# Patient Record
Sex: FEMALE | Race: WHITE | NOT HISPANIC OR LATINO | Employment: FULL TIME | URBAN - METROPOLITAN AREA
[De-identification: names, ages, dates, MRNs, and addresses within clinical notes are randomized per-mention and may not be internally consistent; named-entity substitution may affect disease eponyms.]

---

## 2023-06-05 ENCOUNTER — OFFICE VISIT (OUTPATIENT)
Dept: PRIMARY CARE | Facility: CLINIC | Age: 33
End: 2023-06-05
Payer: COMMERCIAL

## 2023-06-05 VITALS
HEART RATE: 81 BPM | WEIGHT: 173.6 LBS | SYSTOLIC BLOOD PRESSURE: 120 MMHG | OXYGEN SATURATION: 100 % | BODY MASS INDEX: 25.71 KG/M2 | HEIGHT: 69 IN | DIASTOLIC BLOOD PRESSURE: 84 MMHG

## 2023-06-05 DIAGNOSIS — L02.01 CUTANEOUS ABSCESS OF FACE: Primary | ICD-10-CM

## 2023-06-05 PROBLEM — T14.8XXA MUSCLE STRAIN: Status: ACTIVE | Noted: 2023-06-05

## 2023-06-05 PROBLEM — N64.4 PAIN OF RIGHT BREAST: Status: ACTIVE | Noted: 2023-06-05

## 2023-06-05 PROBLEM — H66.90 OTITIS MEDIA: Status: ACTIVE | Noted: 2023-06-05

## 2023-06-05 PROBLEM — J02.9 PHARYNGITIS: Status: ACTIVE | Noted: 2023-06-05

## 2023-06-05 PROCEDURE — 99213 OFFICE O/P EST LOW 20 MIN: CPT | Performed by: NURSE PRACTITIONER

## 2023-06-05 PROCEDURE — 1036F TOBACCO NON-USER: CPT | Performed by: NURSE PRACTITIONER

## 2023-06-05 RX ORDER — CEPHALEXIN 500 MG/1
500 CAPSULE ORAL 2 TIMES DAILY
Qty: 20 CAPSULE | Refills: 0 | Status: SHIPPED | OUTPATIENT
Start: 2023-06-05 | End: 2023-06-15

## 2023-06-05 RX ORDER — MULTIVITAMIN
TABLET ORAL
COMMUNITY

## 2023-06-05 ASSESSMENT — ENCOUNTER SYMPTOMS
EYES NEGATIVE: 1
ROS SKIN COMMENTS: AS NOTED IN HPI
NEUROLOGICAL NEGATIVE: 1
RESPIRATORY NEGATIVE: 1
CARDIOVASCULAR NEGATIVE: 1
GASTROINTESTINAL NEGATIVE: 1
CONSTITUTIONAL NEGATIVE: 1

## 2023-06-05 ASSESSMENT — PAIN SCALES - GENERAL: PAINLEVEL: 2

## 2023-06-05 NOTE — PROGRESS NOTES
"Subjective   Patient ID: Thelma Schafer is a 32 y.o. female who presents for Facial Swelling (X 1 week. It's at it's worse in the morning and gets better as the day goes on but than its back in the morning. She gets some pain. She thought it was a pimple at first but now doesn't think it is. It's warm to the touch. Pain in theam is a 5. ).    HPI   Patient here for ongoing acute concern, last provider was lynn Hernandez 02/28/2022.  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   Current concern:  1) Swelling left side on nose under eye for approximately one week (Tuesday of last week appearted).  Worse in the morning and better throughout day. Some pain, at first she thought is was a pimple. Warm to touch. She is pregnant currently. OBGYN told her to be seen by primary care. She has used warm compresses, cool compression, Bactroban ointment at night.    Skin is so dry. Skin is definitely dry. Flushed were the swelling is. No eye discharge, no change vision. Taking Flonase every night but discontinued use to see if it was causing swelling.  38 weeks on Thursday scheduled to be induced 06/15/2023.    Chronic Concerns:  None   Specialist  - [facility] 05/2022 through outside provider.     Review of Systems   Constitutional: Negative.    HENT: Negative.     Eyes: Negative.    Respiratory: Negative.     Cardiovascular: Negative.    Gastrointestinal: Negative.    Skin:         As noted in HPI    Neurological: Negative.    All other systems reviewed and are negative.      Objective   /84 (BP Location: Right arm, Patient Position: Sitting, BP Cuff Size: Adult)   Pulse 81   Ht 1.753 m (5' 9\")   Wt 78.7 kg (173 lb 9.6 oz)   SpO2 100%   BMI 25.64 kg/m²     Physical Exam  Vitals reviewed.   Constitutional:       Appearance: Normal appearance.   HENT:      Head:        Comments: Swelling as noted on diagram with central area 1 mm dried blood, slightly excoriated skin " surrounding, palpable semi-hard 2 mm rounded palpable under ski, no drainage or warmth noted.       Nose: No nasal tenderness.      Right Sinus: No maxillary sinus tenderness or frontal sinus tenderness.      Left Sinus: No maxillary sinus tenderness or frontal sinus tenderness.        Mouth/Throat:      Lips: Pink.      Mouth: Mucous membranes are moist.      Pharynx: Oropharynx is clear.   Cardiovascular:      Rate and Rhythm: Normal rate.   Pulmonary:      Effort: Pulmonary effort is normal.      Breath sounds: Normal breath sounds.   Skin:     Findings: Lesion present.   Neurological:      Mental Status: She is alert.     Assessment/Plan   Diagnoses and all orders for this visit:  Cutaneous abscess of face  -     cephalexin (Keflex) 500 mg capsule; Take 1 capsule (500 mg) by mouth 2 times a day for 10 days. Call back if no improvement consider increasing 4 x day.  Patient is scheduled to have induction for her delivery 06/15/2023.   PLAN/FOLLOW UP YEARLY WELLNESS.

## 2023-08-08 PROBLEM — O92.70 DISORDER OF LACTATION (HHS-HCC): Status: ACTIVE | Noted: 2023-07-31

## 2023-08-08 PROBLEM — E07.9 THYROID DISORDER: Status: ACTIVE | Noted: 2023-08-08

## 2023-08-08 PROBLEM — L02.01 ABSCESS OF FACE: Status: ACTIVE | Noted: 2023-08-08

## 2023-08-08 PROBLEM — N60.19 FIBROCYSTIC BREAST CHANGES: Status: ACTIVE | Noted: 2023-08-08

## 2023-08-08 RX ORDER — IBUPROFEN 600 MG/1
600 TABLET ORAL EVERY 6 HOURS PRN
COMMUNITY
Start: 2023-06-12

## 2023-08-08 RX ORDER — DOCUSATE SODIUM 100 MG/1
2 CAPSULE, LIQUID FILLED ORAL NIGHTLY
COMMUNITY
Start: 2023-06-12

## 2023-08-10 ENCOUNTER — OFFICE VISIT (OUTPATIENT)
Dept: PRIMARY CARE | Facility: CLINIC | Age: 33
End: 2023-08-10
Payer: COMMERCIAL

## 2023-08-10 VITALS
DIASTOLIC BLOOD PRESSURE: 64 MMHG | HEIGHT: 69 IN | SYSTOLIC BLOOD PRESSURE: 108 MMHG | HEART RATE: 66 BPM | WEIGHT: 155.6 LBS | OXYGEN SATURATION: 97 % | BODY MASS INDEX: 23.05 KG/M2

## 2023-08-10 DIAGNOSIS — Z00.00 WELLNESS EXAMINATION: Primary | ICD-10-CM

## 2023-08-10 DIAGNOSIS — Z13.29 SCREENING FOR THYROID DISORDER: ICD-10-CM

## 2023-08-10 DIAGNOSIS — Z13.1 SCREENING FOR DIABETES MELLITUS: ICD-10-CM

## 2023-08-10 DIAGNOSIS — Z13.220 SCREENING, LIPID: ICD-10-CM

## 2023-08-10 PROCEDURE — 1036F TOBACCO NON-USER: CPT | Performed by: NURSE PRACTITIONER

## 2023-08-10 PROCEDURE — 99385 PREV VISIT NEW AGE 18-39: CPT | Performed by: NURSE PRACTITIONER

## 2023-08-10 ASSESSMENT — ENCOUNTER SYMPTOMS
CONSTITUTIONAL NEGATIVE: 1
ALLERGIC/IMMUNOLOGIC NEGATIVE: 1
GASTROINTESTINAL NEGATIVE: 1
MUSCULOSKELETAL NEGATIVE: 1
NEUROLOGICAL NEGATIVE: 1
HEMATOLOGIC/LYMPHATIC NEGATIVE: 1
ENDOCRINE NEGATIVE: 1
CARDIOVASCULAR NEGATIVE: 1
PSYCHIATRIC NEGATIVE: 1
RESPIRATORY NEGATIVE: 1

## 2023-08-10 ASSESSMENT — PAIN SCALES - GENERAL: PAINLEVEL: 0-NO PAIN

## 2023-08-10 NOTE — PROGRESS NOTES
"Subjective   Patient ID: Thelma Schafer is a 32 y.o. female who presents for Annual Exam (LOV 6/5/23/Pt has form but she forgot to bring it with her, she will send it to us through [employer]. ).    HPI   Patient here for wellness exam, last seen on 06/05/2023. Had her baby boy 2 months ago (Leon)  Will have form for insurance through My chart will need to be printed and faxed once labs reviewed back to insurance.   Current Concerns: None   Chronic concerns; None  Specialist  - OBGYN  Labs 06/2023 through OB     Review of Systems   Constitutional: Negative.    HENT: Negative.     Eyes:         Glasses & contact   Respiratory: Negative.     Cardiovascular: Negative.    Gastrointestinal: Negative.    Endocrine: Negative.    Genitourinary: Negative.    Musculoskeletal: Negative.    Skin: Negative.    Allergic/Immunologic: Negative.    Neurological: Negative.    Hematological: Negative.    Psychiatric/Behavioral: Negative.  Sleep disturbance: has 2 month old baby.        Objective   /64 (BP Location: Left arm, Patient Position: Sitting, BP Cuff Size: Adult)   Pulse 66   Ht 1.74 m (5' 8.5\")   Wt 70.6 kg (155 lb 9.6 oz)   SpO2 97%   BMI 23.31 kg/m²   Waist circumference: 35 3/4\" (91.5 cm)     Physical Exam  Vitals reviewed.   Constitutional:       Appearance: Normal appearance.   HENT:      Right Ear: Tympanic membrane and ear canal normal.      Left Ear: Tympanic membrane and ear canal normal.      Nose: Nose normal.      Mouth/Throat:      Lips: Pink.      Pharynx: Oropharynx is clear.   Eyes:      General: Lids are normal.      Conjunctiva/sclera: Conjunctivae normal.   Neck:      Thyroid: No thyromegaly.   Cardiovascular:      Rate and Rhythm: Normal rate and regular rhythm.      Heart sounds: Normal heart sounds.   Pulmonary:      Effort: Pulmonary effort is normal.      Breath sounds: Normal breath sounds.   Abdominal:      General: Abdomen is flat. Bowel sounds are normal.      Palpations: Abdomen is soft. "   Musculoskeletal:         General: Normal range of motion.      Cervical back: Normal range of motion and neck supple.   Lymphadenopathy:      Cervical: No cervical adenopathy.   Skin:     General: Skin is warm.   Neurological:      General: No focal deficit present.   Psychiatric:         Mood and Affect: Mood normal.         Behavior: Behavior normal.         Judgment: Judgment normal.       Assessment/Plan   Diagnoses and all orders for this visit:  Health Maintenance  Labs- orders today provided   Influenza  Prevnar 13/20- not indicated   Shingrix  not indicated  Colonoscopy- no family history age 45 recommended   Cervical Cancer screen - GYN   Mammogram no family history age 40 recommended  DEXA BONE Density Not indicated   Wellness examination  -     CBC and Auto Differential; Future  -     Comprehensive Metabolic Panel; Future  Screening for thyroid disorder  -     TSH with reflex to Free T4 if abnormal; Future  Screening, lipid  -     Lipid Panel; Future  Screening for diabetes mellitus  -     Hemoglobin A1C; Future  PLAN: follow up yearly for wellness  Complete physical form once received and labs reviewed, let patient know of results and that form faxed.

## 2023-08-12 ENCOUNTER — LAB (OUTPATIENT)
Dept: LAB | Facility: LAB | Age: 33
End: 2023-08-12
Payer: COMMERCIAL

## 2023-08-12 DIAGNOSIS — Z13.1 SCREENING FOR DIABETES MELLITUS: ICD-10-CM

## 2023-08-12 DIAGNOSIS — Z13.220 SCREENING, LIPID: ICD-10-CM

## 2023-08-12 DIAGNOSIS — Z13.29 SCREENING FOR THYROID DISORDER: ICD-10-CM

## 2023-08-12 DIAGNOSIS — Z00.00 WELLNESS EXAMINATION: ICD-10-CM

## 2023-08-12 LAB
ALANINE AMINOTRANSFERASE (SGPT) (U/L) IN SER/PLAS: 15 U/L (ref 7–45)
ALBUMIN (G/DL) IN SER/PLAS: 4.6 G/DL (ref 3.4–5)
ALKALINE PHOSPHATASE (U/L) IN SER/PLAS: 63 U/L (ref 33–110)
ANION GAP IN SER/PLAS: 12 MMOL/L (ref 10–20)
ASPARTATE AMINOTRANSFERASE (SGOT) (U/L) IN SER/PLAS: 14 U/L (ref 9–39)
BASOPHILS (10*3/UL) IN BLOOD BY AUTOMATED COUNT: 0.05 X10E9/L (ref 0–0.1)
BASOPHILS/100 LEUKOCYTES IN BLOOD BY AUTOMATED COUNT: 0.7 % (ref 0–2)
BILIRUBIN TOTAL (MG/DL) IN SER/PLAS: 0.6 MG/DL (ref 0–1.2)
CALCIUM (MG/DL) IN SER/PLAS: 9.7 MG/DL (ref 8.6–10.6)
CARBON DIOXIDE, TOTAL (MMOL/L) IN SER/PLAS: 29 MMOL/L (ref 21–32)
CHLORIDE (MMOL/L) IN SER/PLAS: 104 MMOL/L (ref 98–107)
CHOLESTEROL (MG/DL) IN SER/PLAS: 260 MG/DL (ref 0–199)
CHOLESTEROL IN HDL (MG/DL) IN SER/PLAS: 85.8 MG/DL
CHOLESTEROL/HDL RATIO: 3
CREATININE (MG/DL) IN SER/PLAS: 0.78 MG/DL (ref 0.5–1.05)
EOSINOPHILS (10*3/UL) IN BLOOD BY AUTOMATED COUNT: 0.58 X10E9/L (ref 0–0.7)
EOSINOPHILS/100 LEUKOCYTES IN BLOOD BY AUTOMATED COUNT: 8.4 % (ref 0–6)
ERYTHROCYTE DISTRIBUTION WIDTH (RATIO) BY AUTOMATED COUNT: 11.6 % (ref 11.5–14.5)
ERYTHROCYTE MEAN CORPUSCULAR HEMOGLOBIN CONCENTRATION (G/DL) BY AUTOMATED: 32.2 G/DL (ref 32–36)
ERYTHROCYTE MEAN CORPUSCULAR VOLUME (FL) BY AUTOMATED COUNT: 97 FL (ref 80–100)
ERYTHROCYTES (10*6/UL) IN BLOOD BY AUTOMATED COUNT: 4.41 X10E12/L (ref 4–5.2)
ESTIMATED AVERAGE GLUCOSE FOR HBA1C: 100 MG/DL
GFR FEMALE: >90 ML/MIN/1.73M2
GLUCOSE (MG/DL) IN SER/PLAS: 81 MG/DL (ref 74–99)
HEMATOCRIT (%) IN BLOOD BY AUTOMATED COUNT: 42.6 % (ref 36–46)
HEMOGLOBIN (G/DL) IN BLOOD: 13.7 G/DL (ref 12–16)
HEMOGLOBIN A1C/HEMOGLOBIN TOTAL IN BLOOD: 5.1 %
IMMATURE GRANULOCYTES/100 LEUKOCYTES IN BLOOD BY AUTOMATED COUNT: 0.3 % (ref 0–0.9)
LDL: 164 MG/DL (ref 0–99)
LEUKOCYTES (10*3/UL) IN BLOOD BY AUTOMATED COUNT: 6.9 X10E9/L (ref 4.4–11.3)
LYMPHOCYTES (10*3/UL) IN BLOOD BY AUTOMATED COUNT: 2.8 X10E9/L (ref 1.2–4.8)
LYMPHOCYTES/100 LEUKOCYTES IN BLOOD BY AUTOMATED COUNT: 40.8 % (ref 13–44)
MONOCYTES (10*3/UL) IN BLOOD BY AUTOMATED COUNT: 0.46 X10E9/L (ref 0.1–1)
MONOCYTES/100 LEUKOCYTES IN BLOOD BY AUTOMATED COUNT: 6.7 % (ref 2–10)
NEUTROPHILS (10*3/UL) IN BLOOD BY AUTOMATED COUNT: 2.96 X10E9/L (ref 1.2–7.7)
NEUTROPHILS/100 LEUKOCYTES IN BLOOD BY AUTOMATED COUNT: 43.1 % (ref 40–80)
NRBC (PER 100 WBCS) BY AUTOMATED COUNT: 0 /100 WBC (ref 0–0)
PLATELETS (10*3/UL) IN BLOOD AUTOMATED COUNT: 212 X10E9/L (ref 150–450)
POTASSIUM (MMOL/L) IN SER/PLAS: 4.2 MMOL/L (ref 3.5–5.3)
PROTEIN TOTAL: 7.2 G/DL (ref 6.4–8.2)
SODIUM (MMOL/L) IN SER/PLAS: 141 MMOL/L (ref 136–145)
THYROTROPIN (MIU/L) IN SER/PLAS BY DETECTION LIMIT <= 0.05 MIU/L: 1.66 MIU/L (ref 0.44–3.98)
TRIGLYCERIDE (MG/DL) IN SER/PLAS: 53 MG/DL (ref 0–149)
UREA NITROGEN (MG/DL) IN SER/PLAS: 15 MG/DL (ref 6–23)
VLDL: 11 MG/DL (ref 0–40)

## 2023-08-12 PROCEDURE — 83036 HEMOGLOBIN GLYCOSYLATED A1C: CPT

## 2023-08-12 PROCEDURE — 80053 COMPREHEN METABOLIC PANEL: CPT

## 2023-08-12 PROCEDURE — 84443 ASSAY THYROID STIM HORMONE: CPT

## 2023-08-12 PROCEDURE — 36415 COLL VENOUS BLD VENIPUNCTURE: CPT

## 2023-08-12 PROCEDURE — 80061 LIPID PANEL: CPT

## 2023-08-12 PROCEDURE — 85025 COMPLETE CBC W/AUTO DIFF WBC: CPT

## 2023-08-15 NOTE — RESULT ENCOUNTER NOTE
Lab Results:  Lipid panel elevated total cholesterol and LDL (bad cholesterol) this can be d/t postpartum period and if you are breastfeeding can remain elevated so at this time not going to worry.   All the other labs are unremarkable  Still awaiting your physical exam form that you need completed?

## 2024-08-12 ENCOUNTER — APPOINTMENT (OUTPATIENT)
Dept: PRIMARY CARE | Facility: CLINIC | Age: 34
End: 2024-08-12

## 2024-08-12 VITALS
OXYGEN SATURATION: 100 % | BODY MASS INDEX: 21.53 KG/M2 | DIASTOLIC BLOOD PRESSURE: 72 MMHG | SYSTOLIC BLOOD PRESSURE: 116 MMHG | WEIGHT: 145.4 LBS | HEART RATE: 78 BPM | HEIGHT: 69 IN

## 2024-08-12 DIAGNOSIS — E78.5 HYPERLIPIDEMIA, UNSPECIFIED HYPERLIPIDEMIA TYPE: ICD-10-CM

## 2024-08-12 DIAGNOSIS — Z13.29 SCREENING FOR THYROID DISORDER: ICD-10-CM

## 2024-08-12 DIAGNOSIS — Z12.83 SKIN CANCER SCREENING: ICD-10-CM

## 2024-08-12 DIAGNOSIS — R79.89 LOW VITAMIN D LEVEL: ICD-10-CM

## 2024-08-12 DIAGNOSIS — Z00.00 WELLNESS EXAMINATION: Primary | ICD-10-CM

## 2024-08-12 PROCEDURE — 1036F TOBACCO NON-USER: CPT | Performed by: NURSE PRACTITIONER

## 2024-08-12 PROCEDURE — 3008F BODY MASS INDEX DOCD: CPT | Performed by: NURSE PRACTITIONER

## 2024-08-12 PROCEDURE — 99395 PREV VISIT EST AGE 18-39: CPT | Performed by: NURSE PRACTITIONER

## 2024-08-12 ASSESSMENT — ENCOUNTER SYMPTOMS
GASTROINTESTINAL NEGATIVE: 1
NEUROLOGICAL NEGATIVE: 1
CONSTITUTIONAL NEGATIVE: 1
MUSCULOSKELETAL NEGATIVE: 1
ALLERGIC/IMMUNOLOGIC NEGATIVE: 1
RESPIRATORY NEGATIVE: 1
NERVOUS/ANXIOUS: 1
HEMATOLOGIC/LYMPHATIC NEGATIVE: 1
ENDOCRINE NEGATIVE: 1
SLEEP DISTURBANCE: 0
CARDIOVASCULAR NEGATIVE: 1

## 2024-08-12 NOTE — PROGRESS NOTES
"Subjective   Patient ID: Thelma Schafer is a 33 y.o. female who presents for Annual Exam (Yearly /Lov 8/10/23/Labs 8/12/23).    HPI   Patient here for wellness exam. Last office visit on 08/10/2023.  Current concerns:    1) Referral to dermatology- she has small lesion on right arm seems to has itched and now seems to be not going away.  Chronic concerns; Hyperlipidemia   Specialist  - OBGYN  Labs 08/12/2023- routine  NON SMOKER  Exercise- active with gardening, yard work (no dedicated exercise since having small children)  Diet- fruits and vegetables  Beverages- water and tea,      Review of Systems   Constitutional: Negative.    HENT: Negative.          DDS- every six    Eyes:         Eye exam yearly    Respiratory: Negative.     Cardiovascular: Negative.    Gastrointestinal: Negative.    Endocrine: Negative.    Genitourinary: Negative.         LMP- every 30 days- 5 days typically, heavy initally    Musculoskeletal: Negative.    Skin:         Small lesion right arm .   Allergic/Immunologic: Negative.    Neurological: Negative.    Hematological: Negative.    Psychiatric/Behavioral:  Negative for sleep disturbance. The patient is nervous/anxious (more than previously before children).        Objective   /72 (BP Location: Left arm, Patient Position: Sitting, BP Cuff Size: Adult)   Pulse 78   Ht 1.74 m (5' 8.5\")   Wt 66 kg (145 lb 6.4 oz)   SpO2 100%   BMI 21.79 kg/m²   Weight in August 2023 155.9 lbs   Physical Exam  Vitals reviewed.   Constitutional:       Appearance: She is normal weight.   HENT:      Right Ear: Tympanic membrane and ear canal normal.      Left Ear: Tympanic membrane and ear canal normal.      Nose: Nose normal.      Mouth/Throat:      Mouth: Mucous membranes are moist.      Pharynx: Oropharynx is clear.   Eyes:      General: Lids are normal.      Extraocular Movements: Extraocular movements intact.      Conjunctiva/sclera: Conjunctivae normal.   Neck:      Thyroid: No thyromegaly.      " Vascular: No carotid bruit.   Cardiovascular:      Rate and Rhythm: Normal rate and regular rhythm.      Pulses:           Dorsalis pedis pulses are 2+ on the right side and 2+ on the left side.      Heart sounds: Normal heart sounds.   Pulmonary:      Effort: Pulmonary effort is normal.      Breath sounds: Normal breath sounds. No decreased breath sounds, wheezing or rhonchi.   Abdominal:      General: Bowel sounds are normal.      Palpations: Abdomen is soft.      Tenderness: There is no abdominal tenderness.   Musculoskeletal:      Cervical back: Neck supple.      Right lower leg: No edema.      Left lower leg: No edema.   Lymphadenopathy:      Cervical: No cervical adenopathy.   Skin:     Findings: Lesion (small approximately 1 mm slightly elevated lesion with scaly skin surrounding) present.          Neurological:      Mental Status: She is alert.      Gait: Gait is intact.      Deep Tendon Reflexes:      Reflex Scores:       Bicep reflexes are 1+ on the right side and 1+ on the left side.       Patellar reflexes are 2+ on the right side and 2+ on the left side.  Psychiatric:         Attention and Perception: Attention normal.         Behavior: Behavior normal. Behavior is cooperative.       Assessment/Plan   Labs- orders in EMR   Tdap 01/15/2020  Influenza- annually   Prevnar 13/20- not indicated   Shingrix  not indicated  Colonoscopy- no family history age 45 recommended   Cervical Cancer screen - GYN   Mammogram no family history age 40 recommended  DEXA BONE Density Not indicated     Diagnoses and all orders for this visit:  Wellness examination  -     CBC and Auto Differential; Future  -     Comprehensive Metabolic Panel; Future  Screening for thyroid disorder  -     TSH with reflex to Free T4 if abnormal; Future  Hyperlipidemia, unspecified hyperlipidemia type  -     Lipid Panel; Future  Skin cancer screening  -     Referral to Dermatology  Low vitamin D level  -     Vitamin D 25-Hydroxy,Total (for eval  of Vitamin D levels); Future     PLAN: Follow up yearly  Labs-> communciate through MY CHART

## 2024-09-16 ENCOUNTER — LAB (OUTPATIENT)
Dept: LAB | Facility: LAB | Age: 34
End: 2024-09-16
Payer: COMMERCIAL

## 2024-09-16 DIAGNOSIS — R79.89 LOW VITAMIN D LEVEL: ICD-10-CM

## 2024-09-16 DIAGNOSIS — Z00.00 WELLNESS EXAMINATION: ICD-10-CM

## 2024-09-16 DIAGNOSIS — Z13.29 SCREENING FOR THYROID DISORDER: ICD-10-CM

## 2024-09-16 DIAGNOSIS — E78.5 HYPERLIPIDEMIA, UNSPECIFIED HYPERLIPIDEMIA TYPE: ICD-10-CM

## 2024-09-16 LAB
25(OH)D3 SERPL-MCNC: 26 NG/ML (ref 30–100)
ALBUMIN SERPL BCP-MCNC: 4.7 G/DL (ref 3.4–5)
ALP SERPL-CCNC: 47 U/L (ref 33–110)
ALT SERPL W P-5'-P-CCNC: 6 U/L (ref 7–45)
ANION GAP SERPL CALC-SCNC: 10 MMOL/L (ref 10–20)
AST SERPL W P-5'-P-CCNC: 11 U/L (ref 9–39)
BASOPHILS # BLD AUTO: 0.03 X10*3/UL (ref 0–0.1)
BASOPHILS NFR BLD AUTO: 0.5 %
BILIRUB SERPL-MCNC: 0.6 MG/DL (ref 0–1.2)
BUN SERPL-MCNC: 13 MG/DL (ref 6–23)
CALCIUM SERPL-MCNC: 9.4 MG/DL (ref 8.6–10.6)
CHLORIDE SERPL-SCNC: 105 MMOL/L (ref 98–107)
CHOLEST SERPL-MCNC: 230 MG/DL (ref 0–199)
CHOLESTEROL/HDL RATIO: 3.8
CO2 SERPL-SCNC: 27 MMOL/L (ref 21–32)
CREAT SERPL-MCNC: 0.68 MG/DL (ref 0.5–1.05)
EGFRCR SERPLBLD CKD-EPI 2021: >90 ML/MIN/1.73M*2
EOSINOPHIL # BLD AUTO: 0.08 X10*3/UL (ref 0–0.7)
EOSINOPHIL NFR BLD AUTO: 1.3 %
ERYTHROCYTE [DISTWIDTH] IN BLOOD BY AUTOMATED COUNT: 12.4 % (ref 11.5–14.5)
GLUCOSE SERPL-MCNC: 87 MG/DL (ref 74–99)
HCT VFR BLD AUTO: 42.7 % (ref 36–46)
HDLC SERPL-MCNC: 60.2 MG/DL
HGB BLD-MCNC: 13.9 G/DL (ref 12–16)
IMM GRANULOCYTES # BLD AUTO: 0.01 X10*3/UL (ref 0–0.7)
IMM GRANULOCYTES NFR BLD AUTO: 0.2 % (ref 0–0.9)
LDLC SERPL CALC-MCNC: 158 MG/DL
LYMPHOCYTES # BLD AUTO: 1.79 X10*3/UL (ref 1.2–4.8)
LYMPHOCYTES NFR BLD AUTO: 28.7 %
MCH RBC QN AUTO: 29.9 PG (ref 26–34)
MCHC RBC AUTO-ENTMCNC: 32.6 G/DL (ref 32–36)
MCV RBC AUTO: 92 FL (ref 80–100)
MONOCYTES # BLD AUTO: 0.43 X10*3/UL (ref 0.1–1)
MONOCYTES NFR BLD AUTO: 6.9 %
NEUTROPHILS # BLD AUTO: 3.9 X10*3/UL (ref 1.2–7.7)
NEUTROPHILS NFR BLD AUTO: 62.4 %
NON HDL CHOLESTEROL: 170 MG/DL (ref 0–149)
NRBC BLD-RTO: 0 /100 WBCS (ref 0–0)
PLATELET # BLD AUTO: 237 X10*3/UL (ref 150–450)
POTASSIUM SERPL-SCNC: 4.4 MMOL/L (ref 3.5–5.3)
PROT SERPL-MCNC: 7.3 G/DL (ref 6.4–8.2)
RBC # BLD AUTO: 4.65 X10*6/UL (ref 4–5.2)
SODIUM SERPL-SCNC: 138 MMOL/L (ref 136–145)
TRIGL SERPL-MCNC: 59 MG/DL (ref 0–149)
TSH SERPL-ACNC: 1.8 MIU/L (ref 0.44–3.98)
VLDL: 12 MG/DL (ref 0–40)
WBC # BLD AUTO: 6.2 X10*3/UL (ref 4.4–11.3)

## 2024-09-16 PROCEDURE — 84443 ASSAY THYROID STIM HORMONE: CPT

## 2024-09-16 PROCEDURE — 80061 LIPID PANEL: CPT

## 2024-09-16 PROCEDURE — 36415 COLL VENOUS BLD VENIPUNCTURE: CPT

## 2024-09-16 PROCEDURE — 85025 COMPLETE CBC W/AUTO DIFF WBC: CPT

## 2024-09-16 PROCEDURE — 82306 VITAMIN D 25 HYDROXY: CPT

## 2024-09-16 PROCEDURE — 80053 COMPREHEN METABOLIC PANEL: CPT

## 2024-10-04 ENCOUNTER — OFFICE VISIT (OUTPATIENT)
Dept: PRIMARY CARE | Facility: CLINIC | Age: 34
End: 2024-10-04
Payer: COMMERCIAL

## 2024-10-04 VITALS
BODY MASS INDEX: 22.22 KG/M2 | HEIGHT: 69 IN | DIASTOLIC BLOOD PRESSURE: 80 MMHG | WEIGHT: 150 LBS | SYSTOLIC BLOOD PRESSURE: 108 MMHG | OXYGEN SATURATION: 100 % | HEART RATE: 67 BPM

## 2024-10-04 DIAGNOSIS — Z23 FLU VACCINE NEED: ICD-10-CM

## 2024-10-04 DIAGNOSIS — B37.9 ANTIBIOTIC-INDUCED YEAST INFECTION: ICD-10-CM

## 2024-10-04 DIAGNOSIS — T36.95XA ANTIBIOTIC-INDUCED YEAST INFECTION: ICD-10-CM

## 2024-10-04 DIAGNOSIS — H66.90 ACUTE OTITIS MEDIA, UNSPECIFIED OTITIS MEDIA TYPE: Primary | ICD-10-CM

## 2024-10-04 PROCEDURE — 99213 OFFICE O/P EST LOW 20 MIN: CPT | Performed by: NURSE PRACTITIONER

## 2024-10-04 PROCEDURE — 90471 IMMUNIZATION ADMIN: CPT | Performed by: NURSE PRACTITIONER

## 2024-10-04 PROCEDURE — 3008F BODY MASS INDEX DOCD: CPT | Performed by: NURSE PRACTITIONER

## 2024-10-04 PROCEDURE — 1036F TOBACCO NON-USER: CPT | Performed by: NURSE PRACTITIONER

## 2024-10-04 PROCEDURE — 90656 IIV3 VACC NO PRSV 0.5 ML IM: CPT | Performed by: NURSE PRACTITIONER

## 2024-10-04 RX ORDER — PSEUDOEPHEDRINE HCL 120 MG/1
120 TABLET, FILM COATED, EXTENDED RELEASE ORAL EVERY 12 HOURS
COMMUNITY

## 2024-10-04 RX ORDER — CETIRIZINE HYDROCHLORIDE 5 MG/1
TABLET, CHEWABLE ORAL DAILY
COMMUNITY

## 2024-10-04 RX ORDER — FLUCONAZOLE 150 MG/1
150 TABLET ORAL ONCE
Qty: 1 TABLET | Refills: 0 | Status: SHIPPED | OUTPATIENT
Start: 2024-10-04 | End: 2024-10-04

## 2024-10-04 RX ORDER — AMOXICILLIN AND CLAVULANATE POTASSIUM 875; 125 MG/1; MG/1
875 TABLET, FILM COATED ORAL 2 TIMES DAILY
Qty: 10 TABLET | Refills: 0 | Status: SHIPPED | OUTPATIENT
Start: 2024-10-04 | End: 2024-10-09

## 2024-10-04 ASSESSMENT — ENCOUNTER SYMPTOMS
CARDIOVASCULAR NEGATIVE: 1
MUSCULOSKELETAL NEGATIVE: 1
SINUS PAIN: 0
RESPIRATORY NEGATIVE: 1
CONSTITUTIONAL NEGATIVE: 1
SINUS PRESSURE: 0
HEADACHES: 1
SORE THROAT: 0
GASTROINTESTINAL NEGATIVE: 1

## 2024-10-04 NOTE — PROGRESS NOTES
"Subjective   Patient ID: Thelma Schafer is a 33 y.o. female who presents for Earache (In both ears but mostly in the right. At times it's woken her up during the night from the pain. She will take otc tylenol and it's helped. Started with a sore throat but that's since gone away and now her ears hurt along with she feels her lymph nodes are swollen from both ears down into her neck. /LOV 8/12/24/NOV 8/18/25) and Swollen Glands (Lymph nodes feel swollen to pt x 2 weeks.).    HPI   Patient here for acute visit. Last office visit on 08/12/2024   Current Concern  1) ear pain bilateral, right> left, initially ST 2 weeks ago. Tylenol had helped but not improving.   Chronic concerns: hyperlipidemia  Specialist  - OBGYN  Labs 08/12/2023- routine  NON SMOKER  Exercise- active with gardening, yard work (no dedicated exercise since having small children)  Diet- fruits and vegetables  Beverages- water and tea,e for acute concern    Review of Systems   Constitutional: Negative.    HENT:  Positive for ear pain. Negative for sinus pressure, sinus pain and sore throat.    Respiratory: Negative.     Cardiovascular: Negative.    Gastrointestinal: Negative.    Musculoskeletal: Negative.    Neurological:  Positive for headaches.       Objective   /80 (BP Location: Left arm, Patient Position: Sitting, BP Cuff Size: Adult)   Pulse 67   Ht 1.74 m (5' 8.5\")   Wt 68 kg (150 lb)   SpO2 100%   BMI 22.48 kg/m²     Physical Exam  Vitals reviewed.   Constitutional:       General: She is not in acute distress.     Appearance: She is normal weight. She is not ill-appearing.   HENT:      Right Ear: Swelling present. Tympanic membrane is erythematous.      Left Ear: Swelling present. Tympanic membrane is erythematous and bulging.      Nose: Nose normal.      Mouth/Throat:      Lips: Pink.      Pharynx: Posterior oropharyngeal erythema present. No pharyngeal swelling.   Eyes:      General: Lids are normal.      Conjunctiva/sclera: " Conjunctivae normal.   Cardiovascular:      Rate and Rhythm: Normal rate and regular rhythm.      Heart sounds: Normal heart sounds.   Pulmonary:      Effort: Pulmonary effort is normal.      Breath sounds: No decreased breath sounds, wheezing or rhonchi.   Musculoskeletal:      Cervical back: Neck supple.   Lymphadenopathy:      Cervical: Cervical adenopathy present.   Skin:     General: Skin is warm.      Findings: No rash.   Neurological:      Mental Status: She is alert.   Psychiatric:         Attention and Perception: Attention normal.         Behavior: Behavior normal. Behavior is cooperative.       Assessment/Plan   Diagnoses and all orders for this visit:  Acute otitis media, unspecified otitis media type  -     amoxicillin-pot clavulanate (Augmentin) 875-125 mg tablet; Take 1 tablet (875 mg) by mouth 2 times a day for 5 days.  Antibiotic-induced yeast infection  -     fluconazole (Diflucan) 150 mg tablet; Take 1 tablet (150 mg) by mouth 1 time for 1 dose.  Flu vaccine need    PLAN: Follow up yearly wellness

## 2024-11-01 ENCOUNTER — OFFICE VISIT (OUTPATIENT)
Dept: PRIMARY CARE | Facility: CLINIC | Age: 34
End: 2024-11-01
Payer: COMMERCIAL

## 2024-11-01 VITALS
SYSTOLIC BLOOD PRESSURE: 118 MMHG | DIASTOLIC BLOOD PRESSURE: 74 MMHG | HEIGHT: 69 IN | BODY MASS INDEX: 21.95 KG/M2 | WEIGHT: 148.2 LBS | HEART RATE: 92 BPM | OXYGEN SATURATION: 100 %

## 2024-11-01 DIAGNOSIS — G44.82 ORGASMIC HEADACHE: Primary | ICD-10-CM

## 2024-11-01 PROCEDURE — 3008F BODY MASS INDEX DOCD: CPT | Performed by: NURSE PRACTITIONER

## 2024-11-01 PROCEDURE — 99213 OFFICE O/P EST LOW 20 MIN: CPT | Performed by: NURSE PRACTITIONER

## 2024-11-01 PROCEDURE — 1036F TOBACCO NON-USER: CPT | Performed by: NURSE PRACTITIONER

## 2024-11-01 ASSESSMENT — ENCOUNTER SYMPTOMS
MUSCULOSKELETAL NEGATIVE: 1
CARDIOVASCULAR NEGATIVE: 1
EYES NEGATIVE: 1
SEIZURES: 0
HEMATOLOGIC/LYMPHATIC NEGATIVE: 1
DIZZINESS: 0
CONSTITUTIONAL NEGATIVE: 1
RESPIRATORY NEGATIVE: 1
NUMBNESS: 0
HEADACHES: 1
SPEECH DIFFICULTY: 0
WEAKNESS: 0
PSYCHIATRIC NEGATIVE: 1
NAUSEA: 0
FACIAL ASYMMETRY: 0
LIGHT-HEADEDNESS: 0
TREMORS: 0

## 2024-11-04 ENCOUNTER — TELEPHONE (OUTPATIENT)
Dept: PRIMARY CARE | Facility: CLINIC | Age: 34
End: 2024-11-04
Payer: COMMERCIAL

## 2024-11-04 DIAGNOSIS — G44.82 ORGASMIC HEADACHE: Primary | ICD-10-CM

## 2024-11-04 RX ORDER — SUMATRIPTAN SUCCINATE 25 MG/1
25 TABLET ORAL ONCE AS NEEDED
Qty: 9 TABLET | Refills: 0 | Status: SHIPPED | OUTPATIENT
Start: 2024-11-04

## 2024-11-04 NOTE — TELEPHONE ENCOUNTER
Pt called stating she ended up going to the ER Sunday after having her headache for 72 hours. They ran a CT test there and it came back normal. She still has a headache though and would like to know what to do next? After looking online she found some info and she thought maybe an MRI is next but would like to know what she should do?     LOV 11/1/24  NOV 8/18/25 yearly

## 2024-11-18 ENCOUNTER — APPOINTMENT (OUTPATIENT)
Dept: RADIOLOGY | Facility: CLINIC | Age: 34
End: 2024-11-18
Payer: COMMERCIAL

## 2024-11-25 ENCOUNTER — APPOINTMENT (OUTPATIENT)
Dept: DERMATOLOGY | Facility: CLINIC | Age: 34
End: 2024-11-25
Payer: COMMERCIAL

## 2024-11-25 DIAGNOSIS — D18.01 HEMANGIOMA OF SKIN: ICD-10-CM

## 2024-11-25 DIAGNOSIS — D22.9 MULTIPLE BENIGN NEVI: ICD-10-CM

## 2024-11-25 DIAGNOSIS — D22.9 FIBROUS PAPULE OF SKIN: ICD-10-CM

## 2024-11-25 DIAGNOSIS — D23.9 INTRADERMAL NEVUS: Primary | ICD-10-CM

## 2024-11-25 DIAGNOSIS — Z12.83 SCREENING EXAM FOR SKIN CANCER: ICD-10-CM

## 2024-11-25 DIAGNOSIS — L82.1 SEBORRHEIC KERATOSIS: ICD-10-CM

## 2024-11-25 DIAGNOSIS — L81.4 LENTIGO: ICD-10-CM

## 2024-11-25 PROCEDURE — 1036F TOBACCO NON-USER: CPT | Performed by: DERMATOLOGY

## 2024-11-25 PROCEDURE — 99203 OFFICE O/P NEW LOW 30 MIN: CPT | Performed by: DERMATOLOGY

## 2024-11-25 NOTE — PROGRESS NOTES
Subjective     Thelma Schafer is a 33 y.o. female who presents for the following: Skin Check (Chaperone offered and declined. Pt denies personal history of skin cancer.  ).     Review of Systems:  No other skin or systemic complaints other than what is documented elsewhere in the note.    The following portions of the chart were reviewed this encounter and updated as appropriate:  Tobacco  Allergies  Meds  Problems  Med Hx  Surg Hx  Fam Hx                Objective     Well appearing patient in no apparent distress; mood and affect are within normal limits.    A full examination was performed including scalp, face, neck, chest, abdomen, back, bilateral upper extremities, bilateral lower extremities. Patient declines exam underneath the underwear; patient declines exam of the lower abdomen/mons pubis, buttocks, groin, genitalia, perineal and perianal skin and these areas were not examined.    All findings within normal limits unless otherwise noted below.    Assessment/Plan   1. Intradermal nevus  Right Upper Cutaneous Lip  Dome shaped, flesh-colored/tan papule with comma shaped vessels on dermoscopy    (lesion of patient's concern)  -Discussed nature of diagnosis  -Benign-appearing features on examination  -Reassurance, recommend observation    2. Fibrous papule of skin  Right Nasal Sidewall  Flesh colored papule with regular vessels    (lesion of patient's concern)  -Discussed the nature of the diagnosis  -Reassurance, recommend continued observation    3. Multiple benign nevi  Brown and tan macules and papules with reassuring findings on dermoscopy    -These lesions have benign, reassuring patterns on dermoscopy  -Recommend continued self observation, and to contact the office if any changes in nevi are noticed    4. Lentigo  Tan macules    -Benign appearing on exam  -Reassurance, recommend observation    5. Seborrheic keratosis  Stuck on, waxy macule(s)/papule(s)/plaque(s) with comedo-like openings and milia  like cysts    -Discussed the nature of the diagnosis  -Reassurance, recommend continued observation    6. Hemangioma of skin  Cherry red papules    -Discussed the nature of the diagnosis  -Reassurance, recommend continued observation    7. Screening exam for skin cancer        Discussed/information given on safe sun practices and use of sunscreen, sun protective clothing or sun avoidance. Recommend to use over the counter medication of sunscreen with a SPF 30 or higher on a daily basis prior to sun exposure to reduce the risk of skin cancer.    Follow up in 1 year for FSE  Discussed if there are any changes or development of concerning symptoms (lesion/skin condition is changing, bleeding, enlarging, or worsening) the patient is to contact my office. The patient verbalizes understanding.     Melvi Hidalgo MD  11/25/2024

## 2025-06-03 DIAGNOSIS — G44.82 ORGASMIC HEADACHE: ICD-10-CM

## 2025-06-03 RX ORDER — SUMATRIPTAN SUCCINATE 50 MG/1
50 TABLET ORAL ONCE AS NEEDED
Qty: 9 TABLET | Refills: 1 | Status: SHIPPED | OUTPATIENT
Start: 2025-06-03

## 2025-06-18 DIAGNOSIS — G44.82 ORGASMIC HEADACHE: Primary | ICD-10-CM

## 2025-06-18 RX ORDER — RIZATRIPTAN BENZOATE 5 MG/1
5 TABLET ORAL ONCE AS NEEDED
Qty: 9 TABLET | Refills: 0 | Status: SHIPPED | OUTPATIENT
Start: 2025-06-18 | End: 2025-07-18

## 2025-06-19 ENCOUNTER — OFFICE VISIT (OUTPATIENT)
Dept: PRIMARY CARE | Facility: CLINIC | Age: 35
End: 2025-06-19
Payer: COMMERCIAL

## 2025-06-19 VITALS
OXYGEN SATURATION: 100 % | BODY MASS INDEX: 21.48 KG/M2 | DIASTOLIC BLOOD PRESSURE: 72 MMHG | HEIGHT: 69 IN | SYSTOLIC BLOOD PRESSURE: 118 MMHG | WEIGHT: 145 LBS | HEART RATE: 101 BPM

## 2025-06-19 DIAGNOSIS — G25.3 SLEEP MYOCLONUS: ICD-10-CM

## 2025-06-19 DIAGNOSIS — R20.2 TINGLING OF UPPER EXTREMITY: ICD-10-CM

## 2025-06-19 DIAGNOSIS — G44.85 PRIMARY STABBING HEADACHE: Primary | ICD-10-CM

## 2025-06-19 PROCEDURE — 99213 OFFICE O/P EST LOW 20 MIN: CPT | Performed by: NURSE PRACTITIONER

## 2025-06-19 PROCEDURE — 3008F BODY MASS INDEX DOCD: CPT | Performed by: NURSE PRACTITIONER

## 2025-06-19 RX ORDER — GABAPENTIN 100 MG/1
100 CAPSULE ORAL NIGHTLY
Qty: 51 CAPSULE | Refills: 0 | Status: SHIPPED | OUTPATIENT
Start: 2025-06-19

## 2025-06-19 ASSESSMENT — ENCOUNTER SYMPTOMS
EYES NEGATIVE: 1
TREMORS: 0
GASTROINTESTINAL NEGATIVE: 1
HEADACHES: 1
MUSCULOSKELETAL NEGATIVE: 1
SLEEP DISTURBANCE: 1
SINUS PRESSURE: 1
SEIZURES: 0
LIGHT-HEADEDNESS: 0
FEVER: 0
RESPIRATORY NEGATIVE: 1
CARDIOVASCULAR NEGATIVE: 1
WEAKNESS: 0
FATIGUE: 1
NUMBNESS: 1
DIAPHORESIS: 0

## 2025-06-19 NOTE — PROGRESS NOTES
Subjective   Patient ID: Thelma Schafer is a 34 y.o. female who presents for OTHER (Feels a stabbing pain in her head, getting more frequently, feels foggy after, makes her head jerk when she is sleeping, wakes her up).    HPI   Patient here for head pain and new onset head-jerking when trying to sleep. Last office visit on 11/01/2024  (Sleep myoclonus)  differential dx metabolic disorder vs neurologic disorder vs nutritional deficiency vs post traumatic vs hormonal vs  physiological (normal) anxiety vs heredity    Current Concern:  1) stabbing pian in head left upper forehead/frontal cranium typically  1-2 x week and only at night with head jerking that was waking her up, it would be so significant that she actually wake up and stand up. Then 3 weeks ago had daytime episode, Then started to alternate between left & right  at night,. Throughout the day has generalized pressure sensation within frontal cranium bilaterally.  Reports tingling sensation upper limbs with stabbing pain, more frequent brain fog.   No change in vision- recent eye exam unremarkable. Family hx of migraines.   Had gone to ER 11/03/2024 for head Ct for severe headache, no acute findings.    Sumatriptan was rx and patient contacted office just yesterday with ongoing migraine, Sumatriptan side effects of tingling, shakiness and elevated HR.   Wanted to try Rizatriptan, rx sent   Chronic concerns: hyperlipidemia. Orgasmic HA,   Specialist  - OBGYN  Labs 11//03/2024- routine  NON SMOKER    Review of Systems   Constitutional:  Positive for fatigue. Negative for diaphoresis and fever.   HENT:  Positive for sinus pressure. Negative for ear pain and hearing loss.    Eyes: Negative.    Respiratory: Negative.     Cardiovascular: Negative.    Gastrointestinal: Negative.    Musculoskeletal: Negative.    Neurological:  Positive for numbness (as noted in HPI) and headaches. Negative for tremors, seizures, syncope, weakness and light-headedness.  "  Psychiatric/Behavioral:  Positive for sleep disturbance.      Objective   /72 (BP Location: Left arm, Patient Position: Sitting)   Pulse 101   Ht 1.753 m (5' 9\")   Wt 65.8 kg (145 lb)   SpO2 100%   BMI 21.41 kg/m²   Weight in November 148.3 lbs   Physical Exam  Vitals reviewed.   Constitutional:       Appearance: She is normal weight.   HENT:      Right Ear: Tympanic membrane and ear canal normal.      Left Ear: Tympanic membrane and ear canal normal.   Eyes:      General: Lids are normal.      Extraocular Movements: Extraocular movements intact.      Conjunctiva/sclera: Conjunctivae normal.      Pupils: Pupils are equal, round, and reactive to light.   Neck:      Vascular: No carotid bruit.   Cardiovascular:      Rate and Rhythm: Normal rate and regular rhythm.      Heart sounds: Normal heart sounds.   Pulmonary:      Effort: Pulmonary effort is normal.      Breath sounds: Normal breath sounds.   Musculoskeletal:      Cervical back: Neck supple.   Lymphadenopathy:      Cervical: No cervical adenopathy.   Skin:     General: Skin is warm.      Findings: No rash.   Neurological:      General: No focal deficit present.      Mental Status: She is alert.      Cranial Nerves: Cranial nerves 2-12 are intact.      Motor: Motor function is intact.      Coordination: Coordination is intact.      Gait: Gait is intact.   Psychiatric:         Attention and Perception: Attention normal.         Behavior: Behavior normal. Behavior is cooperative.       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 May increase to 200 mg nightly after 7 days.      PLAN: Follow up 6 weeks    "

## 2025-07-09 ENCOUNTER — HOSPITAL ENCOUNTER (OUTPATIENT)
Dept: RADIOLOGY | Facility: CLINIC | Age: 35
Discharge: HOME | End: 2025-07-09
Payer: COMMERCIAL

## 2025-07-09 DIAGNOSIS — G25.3 SLEEP MYOCLONUS: ICD-10-CM

## 2025-07-09 DIAGNOSIS — R20.2 TINGLING OF UPPER EXTREMITY: ICD-10-CM

## 2025-07-09 DIAGNOSIS — G44.85 PRIMARY STABBING HEADACHE: ICD-10-CM

## 2025-07-09 PROCEDURE — 2550000001 HC RX 255 CONTRASTS: Performed by: NURSE PRACTITIONER

## 2025-07-09 PROCEDURE — A9575 INJ GADOTERATE MEGLUMI 0.1ML: HCPCS | Performed by: NURSE PRACTITIONER

## 2025-07-09 PROCEDURE — 70553 MRI BRAIN STEM W/O & W/DYE: CPT | Performed by: RADIOLOGY

## 2025-07-09 PROCEDURE — 70553 MRI BRAIN STEM W/O & W/DYE: CPT

## 2025-07-09 RX ORDER — GADOTERATE MEGLUMINE 376.9 MG/ML
13 INJECTION INTRAVENOUS
Status: COMPLETED | OUTPATIENT
Start: 2025-07-09 | End: 2025-07-09

## 2025-07-09 RX ADMIN — GADOTERATE MEGLUMINE 13 ML: 376.9 INJECTION INTRAVENOUS at 13:21

## 2025-07-15 DIAGNOSIS — G25.3 SLEEP MYOCLONUS: ICD-10-CM

## 2025-07-15 DIAGNOSIS — G44.85 PRIMARY STABBING HEADACHE: ICD-10-CM

## 2025-07-15 DIAGNOSIS — R20.2 TINGLING OF UPPER EXTREMITY: ICD-10-CM

## 2025-07-15 RX ORDER — GABAPENTIN 300 MG/1
300 TABLET, FILM COATED ORAL
Qty: 30 TABLET | Refills: 0 | Status: SHIPPED | OUTPATIENT
Start: 2025-07-15

## 2025-07-28 ENCOUNTER — APPOINTMENT (OUTPATIENT)
Dept: PRIMARY CARE | Facility: CLINIC | Age: 35
End: 2025-07-28
Payer: COMMERCIAL

## 2025-07-28 VITALS
SYSTOLIC BLOOD PRESSURE: 108 MMHG | DIASTOLIC BLOOD PRESSURE: 74 MMHG | BODY MASS INDEX: 21.3 KG/M2 | HEART RATE: 80 BPM | HEIGHT: 69 IN | WEIGHT: 143.8 LBS | OXYGEN SATURATION: 100 %

## 2025-07-28 DIAGNOSIS — R79.89 LOW VITAMIN D LEVEL: ICD-10-CM

## 2025-07-28 DIAGNOSIS — G25.3 SLEEP MYOCLONUS: ICD-10-CM

## 2025-07-28 DIAGNOSIS — Z13.1 SCREENING FOR DIABETES MELLITUS: ICD-10-CM

## 2025-07-28 DIAGNOSIS — Z00.00 WELLNESS EXAMINATION: ICD-10-CM

## 2025-07-28 DIAGNOSIS — R20.2 TINGLING OF UPPER EXTREMITY: ICD-10-CM

## 2025-07-28 DIAGNOSIS — E78.5 HYPERLIPIDEMIA, UNSPECIFIED HYPERLIPIDEMIA TYPE: Primary | ICD-10-CM

## 2025-07-28 DIAGNOSIS — Z13.29 SCREENING FOR THYROID DISORDER: ICD-10-CM

## 2025-07-28 DIAGNOSIS — G44.85 PRIMARY STABBING HEADACHE: ICD-10-CM

## 2025-07-28 PROCEDURE — 3008F BODY MASS INDEX DOCD: CPT | Performed by: NURSE PRACTITIONER

## 2025-07-28 PROCEDURE — 1036F TOBACCO NON-USER: CPT | Performed by: NURSE PRACTITIONER

## 2025-07-28 PROCEDURE — 99214 OFFICE O/P EST MOD 30 MIN: CPT | Performed by: NURSE PRACTITIONER

## 2025-07-28 RX ORDER — GABAPENTIN 100 MG/1
100 CAPSULE ORAL NIGHTLY PRN
Qty: 14 CAPSULE | Refills: 0 | Status: SHIPPED | OUTPATIENT
Start: 2025-07-28 | End: 2026-01-24

## 2025-07-28 ASSESSMENT — ENCOUNTER SYMPTOMS
SLEEP DISTURBANCE: 1
RESPIRATORY NEGATIVE: 1
CONSTITUTIONAL NEGATIVE: 1
HEADACHES: 1
NAUSEA: 0
CARDIOVASCULAR NEGATIVE: 1
EYES NEGATIVE: 1

## 2025-07-28 NOTE — PROGRESS NOTES
"Subjective   Patient ID: Thelma Schafer is a 34 y.o. female who presents for Follow-up (6m follow up/Lov 6/19/25/Labs 9/16/24/Nov 8/18/25).    HPI   Patient here for follow up. Last office visit on 06/19/2025  Current concern:  1) Headache-  Initiated Gabapentin last appt changed from IR to ER later after visit with improved symptoms, has not tried Rizatriptan. Patient has appt with Neurology on 08/05/2025.  MRI brain 07/09/2025 no evidence of acute infarct or intracranial mass, Prominent lateral retropharyngeal LN or right.    Believes Gabapentin has helped significantly, typically can get through the night without stabbing pain, instead just slightly tingling sensation within temple and some at side of face.  Reports the ER works better when taken with food.   Previously pain described as stabbing left upper forehead/frontal cranium 1-2 x week at night, some head jerking waking her up, it would be so significant that she actually wake up and stand up and daytime episode did occur. Some brain fog and generalized pressure sensation.    Chronic concerns: hyperlipidemia. Orgasmic HA,   Specialist  - OBGYN  Labs 11//03/2024- routine  NON SMOKER    Review of Systems   Constitutional: Negative.    Eyes: Negative.         Less screen time seems to help with headaches symptoms    Respiratory: Negative.     Cardiovascular: Negative.    Gastrointestinal:  Negative for nausea.   Neurological:  Positive for headaches (as noted in HPI).   Psychiatric/Behavioral:  Positive for sleep disturbance.      Objective   /74 (BP Location: Right arm)   Pulse 80   Ht 1.753 m (5' 9\")   Wt 65.2 kg (143 lb 12.8 oz)   SpO2 100%   BMI 21.24 kg/m²   Weight in June 145 lbs   Physical Exam  Vitals reviewed.   Constitutional:       General: She is not in acute distress.     Appearance: She is normal weight. She is not ill-appearing.   Pulmonary:      Effort: Pulmonary effort is normal.     Musculoskeletal:         General: Normal range of " motion.      Cervical back: Neck supple.     Neurological:      General: No focal deficit present.      Mental Status: She is alert.      Cranial Nerves: Cranial nerves 2-12 are intact.      Motor: Motor function is intact.      Gait: Gait is intact.     Psychiatric:         Attention and Perception: Attention normal.         Behavior: Behavior normal. Behavior is cooperative.       Assessment/Plan   Diagnoses and all orders for this visit:  Primary stabbing headache  / Sleep myoclonus /Tingling of upper extremity  All symptoms have improved significantly with Gabapentin  mg evening meal. Added Gabapentin  mg to take as needed with Gabapentin  mg. Does not need refill currently on ER dose.  -     gabapentin (Neurontin) 100 mg capsule; Take 1 capsule (100 mg) by mouth as needed at bedtime (headache).  Reviewed results of MRI brain with and without contrast, overall unremarkable  Has appt with neurologist 08/05/2025 for further evaluation of headache concerns.     PLAN: Follow up at convenience for next Wellness  Lab orders to be completed for review at that appt.

## 2025-08-05 ENCOUNTER — OFFICE VISIT (OUTPATIENT)
Dept: NEUROLOGY | Facility: CLINIC | Age: 35
End: 2025-08-05
Payer: COMMERCIAL

## 2025-08-05 VITALS
TEMPERATURE: 97.2 F | SYSTOLIC BLOOD PRESSURE: 123 MMHG | HEART RATE: 83 BPM | DIASTOLIC BLOOD PRESSURE: 79 MMHG | HEIGHT: 68 IN | WEIGHT: 144.6 LBS | BODY MASS INDEX: 21.92 KG/M2

## 2025-08-05 DIAGNOSIS — R51.9 HEADACHE ON TOP OF HEAD: Primary | ICD-10-CM

## 2025-08-05 DIAGNOSIS — R53.83 TIREDNESS: ICD-10-CM

## 2025-08-05 DIAGNOSIS — G47.33 OSA (OBSTRUCTIVE SLEEP APNEA): ICD-10-CM

## 2025-08-05 DIAGNOSIS — R56.9 SEIZURE (MULTI): ICD-10-CM

## 2025-08-05 PROCEDURE — 3008F BODY MASS INDEX DOCD: CPT | Performed by: NURSE PRACTITIONER

## 2025-08-05 PROCEDURE — 99205 OFFICE O/P NEW HI 60 MIN: CPT | Performed by: NURSE PRACTITIONER

## 2025-08-05 PROCEDURE — 1036F TOBACCO NON-USER: CPT | Performed by: NURSE PRACTITIONER

## 2025-08-05 PROCEDURE — 99203 OFFICE O/P NEW LOW 30 MIN: CPT

## 2025-08-05 RX ORDER — GABAPENTIN 100 MG/1
100 CAPSULE ORAL NIGHTLY PRN
Qty: 14 CAPSULE | Refills: 0 | Status: SHIPPED | OUTPATIENT
Start: 2025-08-05 | End: 2026-02-01

## 2025-08-05 RX ORDER — GABAPENTIN 300 MG/1
300 TABLET, FILM COATED ORAL
Qty: 30 TABLET | Refills: 0 | Status: SHIPPED | OUTPATIENT
Start: 2025-08-05

## 2025-08-05 ASSESSMENT — ENCOUNTER SYMPTOMS
DEPRESSION: 0
LOSS OF SENSATION IN FEET: 0
OCCASIONAL FEELINGS OF UNSTEADINESS: 0

## 2025-08-05 ASSESSMENT — COLUMBIA-SUICIDE SEVERITY RATING SCALE - C-SSRS
6. HAVE YOU EVER DONE ANYTHING, STARTED TO DO ANYTHING, OR PREPARED TO DO ANYTHING TO END YOUR LIFE?: NO
1. IN THE PAST MONTH, HAVE YOU WISHED YOU WERE DEAD OR WISHED YOU COULD GO TO SLEEP AND NOT WAKE UP?: NO
2. HAVE YOU ACTUALLY HAD ANY THOUGHTS OF KILLING YOURSELF?: NO

## 2025-08-05 ASSESSMENT — PATIENT HEALTH QUESTIONNAIRE - PHQ9
2. FEELING DOWN, DEPRESSED OR HOPELESS: NOT AT ALL
1. LITTLE INTEREST OR PLEASURE IN DOING THINGS: NOT AT ALL
SUM OF ALL RESPONSES TO PHQ9 QUESTIONS 1 AND 2: 0

## 2025-08-05 ASSESSMENT — PAIN SCALES - GENERAL: PAINLEVEL_OUTOF10: 0-NO PAIN

## 2025-08-05 NOTE — PROGRESS NOTES
CHIEF COMPLAINT:  headaches    HISTORY OF PRESENT ILLNESS:  34 year old presented to office to establish care for headaches, referred by pcp.   7/9/2025 MRI of brain acutely unremarkable,  nonspecific lateral retropharyngeal lymphnode- per patient was sick at the time- pcp aware of results.  Headaches started winter 2024, sharp acute quick pain on top of head greater on left side  while sleeping,  June 2025 occurred nightly,  pcp started on gbn and ordered mri of brain.   Unaware of triggers.   Currently on Gabapentin er 300 and 100 mg short acting as needed, only needed to take x 2 for tingling sensation.  Brain fog for days after sharp pain with tiredness, no loss of bladder or biting cheek or tongue.   Past tx: sumatriptan, ibuprofen, tylenol   Family hx of migraines: mother, father, brother    Sleep: Goes to bed about 1130 pm,  gets up to start day 645 am.  Sleeps well at night, slight snoring not nightly, can feel tired during day,  occasional can wake up with ha.  Occasional can cough/gasp self awake.     [place] Sleepiness Scale: 5    Denies anxiety,  Denies s/sx of depression, denies thoughts or feelings of self harm, nor has plan.    No tobacco, no illicit drug use, no alcohol use.     MR brain w and wo IV contrast  Result Date: 7/9/2025  1. No evidence of acute infarct or intracranial mass. 2. Prominent lateral retropharyngeal lymph node on the right is entirely nonspecific.       MACRO: None   Signed by: Carmencita St 7/9/2025 2:16 PM Dictation workstation:   [id number]        Current Outpatient Medications on File Prior to Visit   Medication Sig Dispense Refill    CALCIUM CARBONATE-VITAMIN D3 ORAL Take by mouth.      cetirizine (ZyrTEC) 5 mg chewable tablet Chew once daily.      gabapentin (Gralise) 300 mg tablet extended release 24 hr Take 1 tablet (300 mg) by mouth once daily in the evening. Take with meals. 30 tablet 0    gabapentin (Neurontin) 100 mg capsule Take 1 capsule (100 mg) by mouth as  needed at bedtime (headache). 14 capsule 0    ibuprofen 600 mg tablet Take 1 tablet (600 mg) by mouth every 6 hours if needed.      multivitamin with minerals (multivitamin with folic acid) tablet Take by mouth.      pseudoephedrine ER (Sudafed-12 Hour) 120 mg 12 hr tablet Take 1 tablet (120 mg) by mouth every 12 hours. Do not crush, chew, or split.      rizatriptan (Maxalt) 5 mg tablet Take 1 tablet (5 mg) by mouth 1 time if needed for migraine. May repeat in 2 hours if unresolved. Do not exceed 30 mg in 24 hours. 9 tablet 0     No current facility-administered medications on file prior to visit.         Past Medical History:   Diagnosis Date    Headache          Past Surgical History:   Procedure Laterality Date    ADENOIDECTOMY      APPENDECTOMY      SOFT TISSUE TUMOR RESECTION      TONSILLECTOMY           Family History   Problem Relation Name Age of Onset    Hyperlipidemia Mother      Autoimmune disease Mother      Other (depression) Father      Hyperlipidemia Father      Mental illness Father      Migraines Brother      Autoimmune disease Brother      Aneurysm Maternal Grandmother      Clotting disorder Maternal Grandmother      Autoimmune disease Maternal Grandmother      Coronary artery disease Maternal Grandmother      Hypertension Maternal Grandmother      Kidney disease Maternal Grandmother      Heart attack Maternal Grandmother      Osteoporosis Maternal Grandmother      Valvular heart disease Maternal Grandmother      Hyperlipidemia Maternal Grandmother      COPD Maternal Grandfather      Diabetes Paternal Grandfather      Stomach cancer Paternal Grandfather           Social History     Tobacco Use    Smoking status: Never    Smokeless tobacco: Never   Substance Use Topics    Alcohol use: Not Currently         ALLERGIES:    Patient has no known allergies.      REVIEW OF SYSTEMS:  General:     Appetite change: denies. Chills: denies. Fever: denies.  Allergy/Immunology:     Unusual rection to medications,  "food, animals or insects reaction: denies.  Ophthalmologic:     Visual acuity change: denies.  ENT:     Decreased hearing: denies.  Endocrine:     Weight loss: denies.  Respiratory:     Cough: denies. Wheezing: denies.  Cardiovascular:     Chest pain: denies. Palpitations: denies.  Gastrointestinal:     Abdominal pain: denies. Difficulty swallowing: denies  Hematology:    Bleeding problems: denies.  Genitourinary:     Painful urination: denies.  Musculoskeletal:     Joint pain: denies. Joint edema: denies.  Skin:     Rash: denies.  Neurologic:     Ataxia: denies, Tremor: denies.  Psychiatric:    Suicidal thoughts: denies.    Also see HPI for elements of ROS documented therein and for details of positive findings, which shall supersede the foregoing.      OBJECTIVE:    Objective     Vitals:    08/05/25 0907   BP: 123/79   Pulse: 83   Temp: 36.2 °C (97.2 °F)   Weight: 65.6 kg (144 lb 9.6 oz)   Height: 1.732 m (5' 8.19\")       Body mass index is 21.86 kg/m².      EXAMINATION:  General Exam: pleasant, well nourished, well developed, in no acute distress  Head: normocephalic, atraumatic  Eyes: extraocular movement intact (EOMI), pupils equal, round, reactive to light, upper eyelids normal , lower eyelids normal  Ears: no obvious hearing deficit  Nose: Nares patent  Neck/Throat: neck supple, full range of motion  Oral Cavity: mucosa moist  Skin: warm and dry  Lungs:  speaks in full sentences  Chest: normal shape and expansion  Abdomen: bowel sounds present, soft, nontender, nondistended, no guarding or rigidity  Extremities: no edema, no cyanosis  Musculoskeletal: no swelling or deformity  Neurologic: nonfocal, alert and oriented, cognitive exam grossly normal, cranial nerves 2-12 grossly intact, motor strength 5/5 bilateral symmetrically, no drift, coordination intact, sensory exam intact, gait normal  Psych: pleasant, cooperative, good eye contact, speech clear, judgement and insight good      ASSESSMENT/PLAN:  1. " Headache on top of head (Primary)  Tingling sensation to top of head L>R,  continue gbn 300 mg er and gbn 100 prn- decreasing symptoms  7/9/20125 MRI of brain: acutely unremarkable.   - gabapentin (Gralise) 300 mg tablet extended release 24 hr; Take 1 tablet (300 mg) by mouth once daily in the evening. Take with meals.  Dispense: 30 tablet; Refill: 0  - gabapentin (Neurontin) 100 mg capsule; Take 1 capsule (100 mg) by mouth as needed at bedtime (headache).  Dispense: 14 capsule; Refill: 0    2. Seizure (Multi)  Check eeg d/t episode of  brain fog and tiredness   - EEG; Future    3. DARIO (obstructive sleep apnea)  Check psg  positive snoring, wakes up with ha, tiredness  - In-Center Sleep Study; Future    4. Tiredness  See above        Follow up 6 weeks or sooner after testing      I personally spent 60 minutes today, exclusive of procedures, providing care for this patient, including preparation, face to face time, documentation and other services such as review of medical records, diagnostic result, patient education, counseling, coordination of care as specified in the encounter.             Lily Burns, APRN-CNP

## 2025-08-14 ENCOUNTER — TELEPHONE (OUTPATIENT)
Dept: PRIMARY CARE | Facility: CLINIC | Age: 35
End: 2025-08-14
Payer: COMMERCIAL

## 2025-08-17 DIAGNOSIS — G44.85 PRIMARY STABBING HEADACHE: Primary | ICD-10-CM

## 2025-08-17 DIAGNOSIS — G25.3 SLEEP MYOCLONUS: ICD-10-CM

## 2025-08-17 DIAGNOSIS — R20.2 TINGLING OF UPPER EXTREMITY: ICD-10-CM

## 2025-08-18 ENCOUNTER — APPOINTMENT (OUTPATIENT)
Dept: PRIMARY CARE | Facility: CLINIC | Age: 35
End: 2025-08-18
Payer: COMMERCIAL

## 2025-08-19 ENCOUNTER — HOSPITAL ENCOUNTER (OUTPATIENT)
Dept: NEUROLOGY | Facility: HOSPITAL | Age: 35
Discharge: HOME | End: 2025-08-19
Payer: COMMERCIAL

## 2025-08-19 DIAGNOSIS — R56.9 SEIZURE (MULTI): ICD-10-CM

## 2025-08-19 PROCEDURE — 95816 EEG AWAKE AND DROWSY: CPT | Performed by: PSYCHIATRY & NEUROLOGY

## 2025-08-19 PROCEDURE — 95816 EEG AWAKE AND DROWSY: CPT

## 2025-08-23 LAB
25(OH)D3+25(OH)D2 SERPL-MCNC: 31 NG/ML (ref 30–100)
ALBUMIN SERPL-MCNC: 5.3 G/DL (ref 3.6–5.1)
ALBUMIN/CREAT UR: 4 MG/G CREAT
ALP SERPL-CCNC: 44 U/L (ref 31–125)
ALT SERPL-CCNC: 10 U/L (ref 6–29)
ANION GAP SERPL CALCULATED.4IONS-SCNC: 10 MMOL/L (CALC) (ref 7–17)
AST SERPL-CCNC: 13 U/L (ref 10–30)
BASOPHILS # BLD AUTO: 30 CELLS/UL (ref 0–200)
BASOPHILS NFR BLD AUTO: 0.4 %
BILIRUB SERPL-MCNC: 0.8 MG/DL (ref 0.2–1.2)
BUN SERPL-MCNC: 11 MG/DL (ref 7–25)
CALCIUM SERPL-MCNC: 9.5 MG/DL (ref 8.6–10.2)
CHLORIDE SERPL-SCNC: 102 MMOL/L (ref 98–110)
CHOLEST SERPL-MCNC: 218 MG/DL
CHOLEST/HDLC SERPL: 4 (CALC)
CO2 SERPL-SCNC: 25 MMOL/L (ref 20–32)
CREAT SERPL-MCNC: 0.77 MG/DL (ref 0.5–0.97)
CREAT UR-MCNC: 47 MG/DL (ref 20–275)
EGFRCR SERPLBLD CKD-EPI 2021: 102 ML/MIN/1.73M2
EOSINOPHIL # BLD AUTO: 67 CELLS/UL (ref 15–500)
EOSINOPHIL NFR BLD AUTO: 0.9 %
ERYTHROCYTE [DISTWIDTH] IN BLOOD BY AUTOMATED COUNT: 11.9 % (ref 11–15)
EST. AVERAGE GLUCOSE BLD GHB EST-MCNC: 105 MG/DL
EST. AVERAGE GLUCOSE BLD GHB EST-SCNC: 5.8 MMOL/L
GLUCOSE SERPL-MCNC: 82 MG/DL (ref 65–99)
HBA1C MFR BLD: 5.3 %
HCT VFR BLD AUTO: 43.9 % (ref 35–45)
HDLC SERPL-MCNC: 54 MG/DL
HGB BLD-MCNC: 14.6 G/DL (ref 11.7–15.5)
LDLC SERPL CALC-MCNC: 149 MG/DL (CALC)
LYMPHOCYTES # BLD AUTO: 1939 CELLS/UL (ref 850–3900)
LYMPHOCYTES NFR BLD AUTO: 26.2 %
MCH RBC QN AUTO: 30.7 PG (ref 27–33)
MCHC RBC AUTO-ENTMCNC: 33.3 G/DL (ref 32–36)
MCV RBC AUTO: 92.4 FL (ref 80–100)
MICROALBUMIN UR-MCNC: 0.2 MG/DL
MONOCYTES # BLD AUTO: 466 CELLS/UL (ref 200–950)
MONOCYTES NFR BLD AUTO: 6.3 %
NEUTROPHILS # BLD AUTO: 4899 CELLS/UL (ref 1500–7800)
NEUTROPHILS NFR BLD AUTO: 66.2 %
NONHDLC SERPL-MCNC: 164 MG/DL (CALC)
PLATELET # BLD AUTO: 256 THOUSAND/UL (ref 140–400)
PMV BLD REES-ECKER: 9.5 FL (ref 7.5–12.5)
POTASSIUM SERPL-SCNC: 4.1 MMOL/L (ref 3.5–5.3)
PROT SERPL-MCNC: 8.1 G/DL (ref 6.1–8.1)
RBC # BLD AUTO: 4.75 MILLION/UL (ref 3.8–5.1)
SODIUM SERPL-SCNC: 137 MMOL/L (ref 135–146)
TRIGL SERPL-MCNC: 62 MG/DL
TSH SERPL-ACNC: 1.52 MIU/L
WBC # BLD AUTO: 7.4 THOUSAND/UL (ref 3.8–10.8)

## 2025-09-02 DIAGNOSIS — G47.33 OSA (OBSTRUCTIVE SLEEP APNEA): Primary | ICD-10-CM

## 2025-09-04 ENCOUNTER — APPOINTMENT (OUTPATIENT)
Dept: SLEEP MEDICINE | Facility: CLINIC | Age: 35
End: 2025-09-04
Payer: COMMERCIAL

## 2025-09-25 ENCOUNTER — APPOINTMENT (OUTPATIENT)
Dept: NEUROLOGY | Facility: CLINIC | Age: 35
End: 2025-09-25
Payer: COMMERCIAL

## 2025-09-29 ENCOUNTER — APPOINTMENT (OUTPATIENT)
Dept: NEUROLOGY | Facility: CLINIC | Age: 35
End: 2025-09-29
Payer: COMMERCIAL

## 2025-12-01 ENCOUNTER — APPOINTMENT (OUTPATIENT)
Dept: DERMATOLOGY | Facility: CLINIC | Age: 35
End: 2025-12-01
Payer: COMMERCIAL